# Patient Record
Sex: FEMALE
[De-identification: names, ages, dates, MRNs, and addresses within clinical notes are randomized per-mention and may not be internally consistent; named-entity substitution may affect disease eponyms.]

---

## 2017-05-31 NOTE — [id number]
Surgeon's Initial Post Op Note





- Surgeon's Notes


Surgeon: Dr. Lucero


Assistant: Dr. Ham


Type of Anesthesia: IV Sedation, Local


Pre-Operative Diagnosis: mid-back sebaceous cyst


Operative Findings: sebaceous cyst


Post-Operative Diagnosis: mid-back sebaceous cyst


Operation Performed: excision of mid-back sebaceous cyst


Specimen/Specimens Removed: sebaceous cyst


Estimated Blood Loss: EBL {In ML}: 5


Blood Products Given: N/A


Drains Used: No Drains


Post-Op Condition: Good


Date of Surgery/Procedure: 05/31/17


Time of Surgery/Procedure: 13:12

## 2017-05-31 NOTE — OP
PROCEDURE DATE: 05/31/2017



PREOPERATIVE DIAGNOSIS:  Sebaceous cyst, back.



POSTOPERATIVE DIAGNOSIS:  Sebaceous cyst, back.



PROCEDURE CARRIED OUT:  Excision of a 2.5 cm cyst of the back.



SURGEON:  Daniel Lucero Jr., MD



ASSISTANT:  Dr. Ham.



ANESTHESIOLOGIST:  Belen Fisher CRNA.



INDICATIONS:  The patient is a middle-aged woman who has had an infected cyst in her back, drained mu
ltiple times.  Now we did a wide and deep excision.



DESCRIPTION OF PROCEDURE:  The tumor was removed by sharp and blunt dissection after injection of Xyl
ocaine with epinephrine.  It was removed, hemostasis was obtained, wound was closed in layered closur
e.



BLOOD LOSS:  5 mL.



OPERATION CARRIED OUT:  Excision of 2.5 cm sebaceous cyst of the back.





__________________________________________

Daniel Lucero Jr., MD







cc:



DD: 05/31/2017 13:06:13  55

TT: 05/31/2017 17:35:17

Job # [id number]

jn

## 2019-01-26 ENCOUNTER — HOSPITAL ENCOUNTER (OUTPATIENT)
Dept: HOSPITAL 31 - C.VASC | Age: 69
End: 2019-01-26
Payer: MEDICARE

## 2019-04-08 ENCOUNTER — HOSPITAL ENCOUNTER (OUTPATIENT)
Dept: HOSPITAL 31 - C.RADIC | Age: 69
End: 2019-04-08
Payer: MEDICARE

## 2019-04-08 DIAGNOSIS — Z01.818: Primary | ICD-10-CM
